# Patient Record
Sex: MALE | Race: WHITE | Employment: FULL TIME | URBAN - NONMETROPOLITAN AREA
[De-identification: names, ages, dates, MRNs, and addresses within clinical notes are randomized per-mention and may not be internally consistent; named-entity substitution may affect disease eponyms.]

---

## 2017-01-05 ENCOUNTER — TELEPHONE (OUTPATIENT)
Dept: ENDOCRINOLOGY | Age: 27
End: 2017-01-05

## 2017-01-05 DIAGNOSIS — E29.1 HYPOGONADISM MALE: Primary | ICD-10-CM

## 2017-01-05 DIAGNOSIS — R79.89 ABNORMAL LFTS: ICD-10-CM

## 2017-01-25 ENCOUNTER — TELEPHONE (OUTPATIENT)
Dept: ENDOCRINOLOGY | Age: 27
End: 2017-01-25

## 2017-02-03 DIAGNOSIS — E29.1 HYPOGONADISM MALE: Primary | ICD-10-CM

## 2017-03-28 ENCOUNTER — TELEPHONE (OUTPATIENT)
Dept: ENDOCRINOLOGY | Age: 27
End: 2017-03-28

## 2017-03-28 DIAGNOSIS — R79.89 ABNORMAL LFTS: Primary | ICD-10-CM

## 2017-05-23 ENCOUNTER — TELEPHONE (OUTPATIENT)
Dept: ENDOCRINOLOGY | Age: 27
End: 2017-05-23

## 2017-05-23 DIAGNOSIS — R79.89 ABNORMAL LFTS: Primary | ICD-10-CM

## 2017-06-12 ENCOUNTER — OFFICE VISIT (OUTPATIENT)
Dept: ENDOCRINOLOGY | Age: 27
End: 2017-06-12

## 2017-06-12 ENCOUNTER — TELEPHONE (OUTPATIENT)
Dept: ENDOCRINOLOGY | Age: 27
End: 2017-06-12

## 2017-06-12 VITALS
HEIGHT: 62 IN | HEART RATE: 68 BPM | WEIGHT: 146.5 LBS | SYSTOLIC BLOOD PRESSURE: 99 MMHG | BODY MASS INDEX: 26.96 KG/M2 | RESPIRATION RATE: 18 BRPM | DIASTOLIC BLOOD PRESSURE: 55 MMHG

## 2017-06-12 DIAGNOSIS — E29.1 HYPOGONADISM MALE: Primary | ICD-10-CM

## 2017-06-12 DIAGNOSIS — R79.89 ABNORMAL LFTS: ICD-10-CM

## 2017-06-12 PROCEDURE — 99214 OFFICE O/P EST MOD 30 MIN: CPT | Performed by: NURSE PRACTITIONER

## 2017-06-12 PROCEDURE — 1036F TOBACCO NON-USER: CPT | Performed by: NURSE PRACTITIONER

## 2017-06-12 PROCEDURE — G8419 CALC BMI OUT NRM PARAM NOF/U: HCPCS | Performed by: NURSE PRACTITIONER

## 2017-06-12 PROCEDURE — G8427 DOCREV CUR MEDS BY ELIG CLIN: HCPCS | Performed by: NURSE PRACTITIONER

## 2017-06-12 ASSESSMENT — ENCOUNTER SYMPTOMS
NAUSEA: 0
TROUBLE SWALLOWING: 0
VOICE CHANGE: 0
VOMITING: 0
ABDOMINAL PAIN: 0
SHORTNESS OF BREATH: 0

## 2017-07-13 ENCOUNTER — TELEPHONE (OUTPATIENT)
Dept: ENDOCRINOLOGY | Age: 27
End: 2017-07-13

## 2017-10-24 ENCOUNTER — OFFICE VISIT (OUTPATIENT)
Dept: ENDOCRINOLOGY | Age: 27
End: 2017-10-24
Payer: COMMERCIAL

## 2017-10-24 VITALS
SYSTOLIC BLOOD PRESSURE: 107 MMHG | BODY MASS INDEX: 27.05 KG/M2 | WEIGHT: 147 LBS | HEART RATE: 69 BPM | HEIGHT: 62 IN | RESPIRATION RATE: 16 BRPM | DIASTOLIC BLOOD PRESSURE: 55 MMHG

## 2017-10-24 DIAGNOSIS — E29.1 HYPOGONADISM MALE: Primary | ICD-10-CM

## 2017-10-24 DIAGNOSIS — R79.89 ABNORMAL LFTS: ICD-10-CM

## 2017-10-24 DIAGNOSIS — Q53.20 BILATERAL UNDESCENDED TESTICLES, UNSPECIFIED LOCATION: ICD-10-CM

## 2017-10-24 PROCEDURE — 99214 OFFICE O/P EST MOD 30 MIN: CPT | Performed by: INTERNAL MEDICINE

## 2017-10-24 PROCEDURE — 1036F TOBACCO NON-USER: CPT | Performed by: INTERNAL MEDICINE

## 2017-10-24 PROCEDURE — G8427 DOCREV CUR MEDS BY ELIG CLIN: HCPCS | Performed by: INTERNAL MEDICINE

## 2017-10-24 PROCEDURE — G8484 FLU IMMUNIZE NO ADMIN: HCPCS | Performed by: INTERNAL MEDICINE

## 2017-10-24 PROCEDURE — G8417 CALC BMI ABV UP PARAM F/U: HCPCS | Performed by: INTERNAL MEDICINE

## 2017-10-24 NOTE — LETTER
Endocrine Diabetes Metabolism [facility]  [address]  [address]  [address] [address]  Phone: [phone]  Fax: [phone]    Zhen Jerome MD      10/24/17    Dr. Isabel Savage    Patient: Jeanne Baez  MR Number: [id number]  YOB: 1990  Date of Visit: 10/24/2017      Dear Dr. Isabel Savage    Thank you for the request for consultation for Yahir Solis to me for the evaluation of   Chief Complaint   Patient presents with    Other     hypogonadism    Other     abnormal LFTS    Results     06/29/17 completed   . Below are the relevant portions of my assessment and plan of care. Subjective: Follow-up visit for this 60-year-old male being seen for hypogonadism which was associated with delayed puberty. The patient started testosterone replacement when he was a child and saw me for the first time in 2011 for continuation of care when he became an adult. At that time he had already been on testosterone replacement for 3 years, with significant growth and increased in facial hair and also change of voice. The patient recently developed abnormal liver function tests with testosterone replacement which forced me to discontinue testosterone. He was evaluated by GI with no specific findings or recommendations. The patient has significant medical history including bilateral cleft lip and cleft palate which was repaired in several procedures. 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 Patient was initially evaluated for cochlear implantation that this decision was not pursued. He is also being evaluated by hearing aids. At this point he communicates using sign language and was accompanied by his mother who has always been there with him at his visits. According to the mother, there is no fatigue or depression. Patient continues to shave regularly. He is functioning okay.  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 He has some missing rebound the left solitary kidney, status post cryptorchidism. He also has a history of ASD which closed spontaneously. Past Medical History:   Diagnosis Date    Fracture 5/2011    left orbital, nose    Heart trouble     closed asd    Hypogonadism male     Kidney trouble     one kidney      Past Surgical History:   Procedure Laterality Date    CLEFT LIP REPAIR      bilateral    COLON SURGERY      rotation repaired    COSMETIC SURGERY      ESOPHAGUS SURGERY      OTHER SURGICAL HISTORY  1/2012    cholesteatoma removed right ear    TRACHEAL SURGERY      TRACHEOTOMY      separation       Family History   Problem Relation Age of Onset    High Blood Pressure Mother     High Cholesterol Mother     Thyroid Disease Mother      Social History   Substance Use Topics    Smoking status: Never Smoker    Smokeless tobacco: Never Used    Alcohol use No      Current Outpatient Prescriptions   Medication Sig Dispense Refill    Nutritional Supplements (COMPLETE NUTRITION) LIQD 2 cans per G tube 4 x daily (Patient taking differently: 2 cans per G tube 3 x daily plus 1 can evening snack) 2 Can 0     No current facility-administered medications for this visit.       Allergies   Allergen Reactions    Anesthetics, Halogenated      Health Maintenance   Topic Date Due    HIV screen  05/24/2006    DTaP/Tdap/Td vaccine (1 - Tdap) 05/24/2010    Flu vaccine (1) 09/01/2017       Labs  No results found for: LABA1C  No results found for: EAG  Lab Results   Component Value Date    TSH 1.79 11/19/2016     Lab Results   Component Value Date    CHOL 148 11/19/2016    CHOL 166 01/23/2016    CHOL 166 02/07/2015     Lab Results   Component Value Date    TRIG 73 11/19/2016    TRIG 80 01/23/2016    TRIG 86 02/07/2015     Lab Results   Component Value Date    HDL 67 11/19/2016 HDL 71 (A) 01/23/2016    HDL 66 02/07/2015     Lab Results   Component Value Date    LDLCALC 66 11/19/2016    LDLCALC 79 01/23/2016    LDLCALC 83 02/07/2015     Lab Results   Component Value Date    VLDL 15 11/19/2016    VLDL 16 01/23/2016    VLDL 17 02/07/2015     No results found for: CHOLHDLRATIO      Review of Systems  Constitutional: negative for chills, fatigue and fevers  Eyes: negative for irritation, redness and visual disturbance  Respiratory: negative for cough, shortness of breath and wheezing  Cardiovascular: negative for chest pain, irregular heart beat and palpitations    The remainder of systems were reviewed and negative. Objective:   BP (!) 107/55   Pulse 69   Resp 16   Ht 5' 1.81\" (1.57 m)   Wt 147 lb (66.7 kg)   BMI 27.05 kg/m²      General:  alert, appears stated age, cooperative and no distress   Oropharynx: normal findings: lips normal without lesions, buccal mucosa normal, gums healthy and tongue midline and normal    Eyes:  negative findings: lids and lashes normal, conjunctivae and sclerae normal, corneas clear and pupils equal, round, reactive to light and accomodation       Neck: Anterior neck stoma s/p repair if tracheo-esophageal fistula repair. Thyroid:  no palpable nodule   Lung: clear to auscultation bilaterally   Heart:  regular rate and rhythm, S1, S2 normal, no murmur, click, rub or gallop and normal apical impulse   Abdomen: soft, non-tender; bowel sounds normal; no masses,  no organomegaly. PEG tube in place. Extremities: extremities normal, atraumatic, no cyanosis or edema and Homans sign is negative, no sign of DVT   Pulses: 2+ and symmetric   Skin: warm and dry, no hyperpigmentation, vitiligo, or suspicious lesions   Neuro: normal without focal findings, mental status, speech normal, alert and oriented x3, MAGGIE, cranial nerves 2-12 intact, muscle tone and strength normal and symmetric.        Assessment/Plan: Mercedes Back MD

## 2017-10-24 NOTE — PROGRESS NOTES
[facility] PROFESSIONAL SERV[facility]  ENDOCRINE DIABETES METABOLISM [facility]  [address]  [address]  Dept: [phone]  Dept Fax: [phone] : [phone]    Visit Date: 10/24/2017    Dank Obando is a 33 y.o. male who presents today for:  Chief Complaint   Patient presents with    Other     hypogonadism    Other     abnormal LFTS    Results     06/29/17 completed            Subjective: Follow-up visit for this 41-year-old male being seen for hypogonadism which was associated with delayed puberty. The patient started testosterone replacement when he was a child and saw me for the first time in 2011 for continuation of care when he became an adult. At that time he had already been on testosterone replacement for 3 years, with significant growth and increased in facial hair and also change of voice. The patient recently developed abnormal liver function tests with testosterone replacement which forced me to discontinue testosterone. He was evaluated by GI with no specific findings or recommendations. The patient has significant medical history including bilateral cleft lip and cleft palate which was repaired in several procedures. 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 Patient was initially evaluated for cochlear implantation that this decision was not pursued. He is also being evaluated by hearing aids. At this point he communicates using sign language and was accompanied by his mother who has always been there with him at his visits. According to the mother, there is no fatigue or depression. Patient continues to shave regularly. He is functioning okay. 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 He has some missing rebound the left solitary kidney, status post cryptorchidism.   He also has a history of ASD which closed spontaneously. Past Medical History:   Diagnosis Date    Fracture 5/2011    left orbital, nose    Heart trouble     closed asd    Hypogonadism male     Kidney trouble     one kidney      Past Surgical History:   Procedure Laterality Date    CLEFT LIP REPAIR      bilateral    COLON SURGERY      rotation repaired    COSMETIC SURGERY      ESOPHAGUS SURGERY      OTHER SURGICAL HISTORY  1/2012    cholesteatoma removed right ear    TRACHEAL SURGERY      TRACHEOTOMY      separation       Family History   Problem Relation Age of Onset    High Blood Pressure Mother     High Cholesterol Mother     Thyroid Disease Mother      Social History   Substance Use Topics    Smoking status: Never Smoker    Smokeless tobacco: Never Used    Alcohol use No      Current Outpatient Prescriptions   Medication Sig Dispense Refill    Nutritional Supplements (COMPLETE NUTRITION) LIQD 2 cans per G tube 4 x daily (Patient taking differently: 2 cans per G tube 3 x daily plus 1 can evening snack) 2 Can 0     No current facility-administered medications for this visit.       Allergies   Allergen Reactions    Anesthetics, Halogenated      Health Maintenance   Topic Date Due    HIV screen  05/24/2006    DTaP/Tdap/Td vaccine (1 - Tdap) 05/24/2010    Flu vaccine (1) 09/01/2017       Labs  No results found for: LABA1C  No results found for: EAG  Lab Results   Component Value Date    TSH 1.79 11/19/2016     Lab Results   Component Value Date    CHOL 148 11/19/2016    CHOL 166 01/23/2016    CHOL 166 02/07/2015     Lab Results   Component Value Date    TRIG 73 11/19/2016    TRIG 80 01/23/2016    TRIG 86 02/07/2015     Lab Results   Component Value Date    HDL 67 11/19/2016    HDL 71 (A) 01/23/2016    HDL 66 02/07/2015     Lab Results   Component Value Date    LDLCALC 66 11/19/2016    LDLCALC 79 01/23/2016    LDLCALC 83 02/07/2015     Lab Results   Component Value Date    VLDL 15 11/19/2016    VLDL 16

## 2017-12-14 DIAGNOSIS — E29.1 HYPOGONADISM MALE: ICD-10-CM

## 2017-12-27 ENCOUNTER — TELEPHONE (OUTPATIENT)
Dept: ENDOCRINOLOGY | Age: 27
End: 2017-12-27

## 2017-12-27 NOTE — TELEPHONE ENCOUNTER
----- Message from Dell Ibarra MD sent at 12/21/2017  7:34 PM EST -----  Is obtain DEXA images for my review.

## 2018-01-03 ENCOUNTER — TELEPHONE (OUTPATIENT)
Dept: ENDOCRINOLOGY | Age: 28
End: 2018-01-03

## 2018-01-04 ENCOUNTER — TELEPHONE (OUTPATIENT)
Dept: ENDOCRINOLOGY | Age: 28
End: 2018-01-04

## 2018-05-16 ENCOUNTER — TELEPHONE (OUTPATIENT)
Dept: INTERNAL MEDICINE CLINIC | Age: 28
End: 2018-05-16

## 2018-05-16 DIAGNOSIS — R79.89 ABNORMAL LFTS: ICD-10-CM

## 2018-05-16 DIAGNOSIS — E29.1 HYPOGONADISM MALE: Primary | ICD-10-CM

## 2018-05-16 DIAGNOSIS — R79.89 LOW TESTOSTERONE: ICD-10-CM

## 2021-02-04 ENCOUNTER — IMMUNIZATION (OUTPATIENT)
Dept: PRIMARY CARE CLINIC | Age: 31
End: 2021-02-04
Payer: COMMERCIAL

## 2021-02-04 PROCEDURE — 91301 COVID-19, MODERNA VACCINE 100MCG/0.5ML DOSE: CPT | Performed by: FAMILY MEDICINE

## 2021-02-04 PROCEDURE — 0011A PR IMM ADMN SARSCOV2 100 MCG/0.5 ML 1ST DOSE: CPT | Performed by: FAMILY MEDICINE

## 2021-03-04 ENCOUNTER — IMMUNIZATION (OUTPATIENT)
Dept: PRIMARY CARE CLINIC | Age: 31
End: 2021-03-04
Payer: COMMERCIAL

## 2021-03-04 PROCEDURE — 91301 COVID-19, MODERNA VACCINE 100MCG/0.5ML DOSE: CPT | Performed by: FAMILY MEDICINE

## 2021-03-04 PROCEDURE — 0012A COVID-19, MODERNA VACCINE 100MCG/0.5ML DOSE: CPT | Performed by: FAMILY MEDICINE
